# Patient Record
Sex: FEMALE | Race: WHITE | HISPANIC OR LATINO | URBAN - METROPOLITAN AREA
[De-identification: names, ages, dates, MRNs, and addresses within clinical notes are randomized per-mention and may not be internally consistent; named-entity substitution may affect disease eponyms.]

---

## 2018-06-22 ENCOUNTER — EMERGENCY (EMERGENCY)
Facility: HOSPITAL | Age: 43
LOS: 1 days | Discharge: HOME | End: 2018-06-22
Attending: EMERGENCY MEDICINE
Payer: SELF-PAY

## 2018-06-22 VITALS
RESPIRATION RATE: 22 BRPM | SYSTOLIC BLOOD PRESSURE: 125 MMHG | HEART RATE: 108 BPM | HEIGHT: 66 IN | WEIGHT: 139.99 LBS | OXYGEN SATURATION: 98 % | DIASTOLIC BLOOD PRESSURE: 78 MMHG | TEMPERATURE: 102 F

## 2018-06-22 VITALS
HEART RATE: 85 BPM | TEMPERATURE: 99 F | OXYGEN SATURATION: 100 % | DIASTOLIC BLOOD PRESSURE: 67 MMHG | RESPIRATION RATE: 20 BRPM | SYSTOLIC BLOOD PRESSURE: 114 MMHG

## 2018-06-22 DIAGNOSIS — Z98.890 OTHER SPECIFIED POSTPROCEDURAL STATES: Chronic | ICD-10-CM

## 2018-06-22 DIAGNOSIS — Z90.49 ACQUIRED ABSENCE OF OTHER SPECIFIED PARTS OF DIGESTIVE TRACT: Chronic | ICD-10-CM

## 2018-06-22 LAB
ACETONE SERPL-MCNC: NEGATIVE — SIGNIFICANT CHANGE UP
ALBUMIN SERPL ELPH-MCNC: 3.6 G/DL — SIGNIFICANT CHANGE UP (ref 3.5–5)
ALP SERPL-CCNC: 65 U/L — SIGNIFICANT CHANGE UP (ref 40–120)
ALT FLD-CCNC: 32 U/L DA — SIGNIFICANT CHANGE UP (ref 10–60)
ANION GAP SERPL CALC-SCNC: 7 MMOL/L — SIGNIFICANT CHANGE UP (ref 5–17)
APPEARANCE UR: CLEAR — SIGNIFICANT CHANGE UP
APTT BLD: 26.8 SEC — LOW (ref 27.5–37.4)
AST SERPL-CCNC: 29 U/L — SIGNIFICANT CHANGE UP (ref 10–40)
BACTERIA # UR AUTO: ABNORMAL /HPF
BASOPHILS # BLD AUTO: 0.1 K/UL — SIGNIFICANT CHANGE UP (ref 0–0.2)
BASOPHILS # BLD AUTO: 0.2 K/UL — SIGNIFICANT CHANGE UP (ref 0–0.2)
BASOPHILS NFR BLD AUTO: 0.5 % — SIGNIFICANT CHANGE UP (ref 0–2)
BASOPHILS NFR BLD AUTO: 1.2 % — SIGNIFICANT CHANGE UP (ref 0–2)
BILIRUB SERPL-MCNC: 0.9 MG/DL — SIGNIFICANT CHANGE UP (ref 0.2–1.2)
BILIRUB UR-MCNC: NEGATIVE — SIGNIFICANT CHANGE UP
BUN SERPL-MCNC: 6 MG/DL — LOW (ref 7–18)
CALCIUM SERPL-MCNC: 9 MG/DL — SIGNIFICANT CHANGE UP (ref 8.4–10.5)
CHLORIDE SERPL-SCNC: 104 MMOL/L — SIGNIFICANT CHANGE UP (ref 96–108)
CO2 SERPL-SCNC: 27 MMOL/L — SIGNIFICANT CHANGE UP (ref 22–31)
COLOR SPEC: SIGNIFICANT CHANGE UP
CREAT SERPL-MCNC: 0.73 MG/DL — SIGNIFICANT CHANGE UP (ref 0.5–1.3)
DIFF PNL FLD: NEGATIVE — SIGNIFICANT CHANGE UP
EOSINOPHIL # BLD AUTO: 0 K/UL — SIGNIFICANT CHANGE UP (ref 0–0.5)
EOSINOPHIL # BLD AUTO: 0 K/UL — SIGNIFICANT CHANGE UP (ref 0–0.5)
EOSINOPHIL NFR BLD AUTO: 0 % — SIGNIFICANT CHANGE UP (ref 0–6)
EOSINOPHIL NFR BLD AUTO: 0.2 % — SIGNIFICANT CHANGE UP (ref 0–6)
EPI CELLS # UR: ABNORMAL /HPF
GLUCOSE SERPL-MCNC: 109 MG/DL — HIGH (ref 70–99)
GLUCOSE UR QL: NEGATIVE — SIGNIFICANT CHANGE UP
HCG SERPL-ACNC: <1 MIU/ML — SIGNIFICANT CHANGE UP
HCG UR QL: NEGATIVE — SIGNIFICANT CHANGE UP
HCT VFR BLD CALC: 41.4 % — SIGNIFICANT CHANGE UP (ref 34.5–45)
HCT VFR BLD CALC: 43.2 % — SIGNIFICANT CHANGE UP (ref 34.5–45)
HGB BLD-MCNC: 13.5 G/DL — SIGNIFICANT CHANGE UP (ref 11.5–15.5)
HGB BLD-MCNC: 14.2 G/DL — SIGNIFICANT CHANGE UP (ref 11.5–15.5)
INR BLD: 1.01 RATIO — SIGNIFICANT CHANGE UP (ref 0.88–1.16)
KETONES UR-MCNC: NEGATIVE — SIGNIFICANT CHANGE UP
LACTATE SERPL-SCNC: 1.7 MMOL/L — SIGNIFICANT CHANGE UP (ref 0.7–2)
LEUKOCYTE ESTERASE UR-ACNC: ABNORMAL
LIDOCAIN IGE QN: 83 U/L — SIGNIFICANT CHANGE UP (ref 73–393)
LYMPHOCYTES # BLD AUTO: 1.1 K/UL — SIGNIFICANT CHANGE UP (ref 1–3.3)
LYMPHOCYTES # BLD AUTO: 1.4 K/UL — SIGNIFICANT CHANGE UP (ref 1–3.3)
LYMPHOCYTES # BLD AUTO: 11.1 % — LOW (ref 13–44)
LYMPHOCYTES # BLD AUTO: 6 % — LOW (ref 13–44)
MAGNESIUM SERPL-MCNC: 2.2 MG/DL — SIGNIFICANT CHANGE UP (ref 1.6–2.6)
MCHC RBC-ENTMCNC: 31.7 PG — SIGNIFICANT CHANGE UP (ref 27–34)
MCHC RBC-ENTMCNC: 31.7 PG — SIGNIFICANT CHANGE UP (ref 27–34)
MCHC RBC-ENTMCNC: 32.6 GM/DL — SIGNIFICANT CHANGE UP (ref 32–36)
MCHC RBC-ENTMCNC: 32.9 GM/DL — SIGNIFICANT CHANGE UP (ref 32–36)
MCV RBC AUTO: 96.5 FL — SIGNIFICANT CHANGE UP (ref 80–100)
MCV RBC AUTO: 97.2 FL — SIGNIFICANT CHANGE UP (ref 80–100)
MONOCYTES # BLD AUTO: 0.4 K/UL — SIGNIFICANT CHANGE UP (ref 0–0.9)
MONOCYTES # BLD AUTO: 1.3 K/UL — HIGH (ref 0–0.9)
MONOCYTES NFR BLD AUTO: 3 % — SIGNIFICANT CHANGE UP (ref 2–14)
MONOCYTES NFR BLD AUTO: 7.2 % — SIGNIFICANT CHANGE UP (ref 2–14)
NEUTROPHILS # BLD AUTO: 10.6 K/UL — HIGH (ref 1.8–7.4)
NEUTROPHILS # BLD AUTO: 15.2 K/UL — HIGH (ref 1.8–7.4)
NEUTROPHILS NFR BLD AUTO: 85.2 % — HIGH (ref 43–77)
NEUTROPHILS NFR BLD AUTO: 85.7 % — HIGH (ref 43–77)
NITRITE UR-MCNC: NEGATIVE — SIGNIFICANT CHANGE UP
PH UR: 8 — SIGNIFICANT CHANGE UP (ref 5–8)
PLATELET # BLD AUTO: 216 K/UL — SIGNIFICANT CHANGE UP (ref 150–400)
PLATELET # BLD AUTO: 228 K/UL — SIGNIFICANT CHANGE UP (ref 150–400)
POTASSIUM SERPL-MCNC: 4.4 MMOL/L — SIGNIFICANT CHANGE UP (ref 3.5–5.3)
POTASSIUM SERPL-SCNC: 4.4 MMOL/L — SIGNIFICANT CHANGE UP (ref 3.5–5.3)
PROT SERPL-MCNC: 7.1 G/DL — SIGNIFICANT CHANGE UP (ref 6–8.3)
PROT UR-MCNC: NEGATIVE — SIGNIFICANT CHANGE UP
PROTHROM AB SERPL-ACNC: 11 SEC — SIGNIFICANT CHANGE UP (ref 9.8–12.7)
RBC # BLD: 4.26 M/UL — SIGNIFICANT CHANGE UP (ref 3.8–5.2)
RBC # BLD: 4.48 M/UL — SIGNIFICANT CHANGE UP (ref 3.8–5.2)
RBC # FLD: 11.9 % — SIGNIFICANT CHANGE UP (ref 10.3–14.5)
RBC # FLD: 12.1 % — SIGNIFICANT CHANGE UP (ref 10.3–14.5)
RBC CASTS # UR COMP ASSIST: SIGNIFICANT CHANGE UP /HPF (ref 0–2)
SODIUM SERPL-SCNC: 138 MMOL/L — SIGNIFICANT CHANGE UP (ref 135–145)
SP GR SPEC: 1.01 — SIGNIFICANT CHANGE UP (ref 1.01–1.02)
UROBILINOGEN FLD QL: NEGATIVE — SIGNIFICANT CHANGE UP
WBC # BLD: 12.4 K/UL — HIGH (ref 3.8–10.5)
WBC # BLD: 17.8 K/UL — HIGH (ref 3.8–10.5)
WBC # FLD AUTO: 12.4 K/UL — HIGH (ref 3.8–10.5)
WBC # FLD AUTO: 17.8 K/UL — HIGH (ref 3.8–10.5)
WBC UR QL: SIGNIFICANT CHANGE UP /HPF (ref 0–5)

## 2018-06-22 PROCEDURE — 99285 EMERGENCY DEPT VISIT HI MDM: CPT

## 2018-06-22 PROCEDURE — 74177 CT ABD & PELVIS W/CONTRAST: CPT

## 2018-06-22 PROCEDURE — 83605 ASSAY OF LACTIC ACID: CPT

## 2018-06-22 PROCEDURE — 84702 CHORIONIC GONADOTROPIN TEST: CPT

## 2018-06-22 PROCEDURE — 80053 COMPREHEN METABOLIC PANEL: CPT

## 2018-06-22 PROCEDURE — 96374 THER/PROPH/DIAG INJ IV PUSH: CPT | Mod: XU

## 2018-06-22 PROCEDURE — 96375 TX/PRO/DX INJ NEW DRUG ADDON: CPT

## 2018-06-22 PROCEDURE — 81001 URINALYSIS AUTO W/SCOPE: CPT

## 2018-06-22 PROCEDURE — 82009 KETONE BODYS QUAL: CPT

## 2018-06-22 PROCEDURE — 87086 URINE CULTURE/COLONY COUNT: CPT

## 2018-06-22 PROCEDURE — 76830 TRANSVAGINAL US NON-OB: CPT

## 2018-06-22 PROCEDURE — 85027 COMPLETE CBC AUTOMATED: CPT

## 2018-06-22 PROCEDURE — 85730 THROMBOPLASTIN TIME PARTIAL: CPT

## 2018-06-22 PROCEDURE — 76856 US EXAM PELVIC COMPLETE: CPT | Mod: 26

## 2018-06-22 PROCEDURE — 76830 TRANSVAGINAL US NON-OB: CPT | Mod: 26

## 2018-06-22 PROCEDURE — 74177 CT ABD & PELVIS W/CONTRAST: CPT | Mod: 26

## 2018-06-22 PROCEDURE — 83735 ASSAY OF MAGNESIUM: CPT

## 2018-06-22 PROCEDURE — 85610 PROTHROMBIN TIME: CPT

## 2018-06-22 PROCEDURE — 83690 ASSAY OF LIPASE: CPT

## 2018-06-22 PROCEDURE — 81025 URINE PREGNANCY TEST: CPT

## 2018-06-22 PROCEDURE — 76856 US EXAM PELVIC COMPLETE: CPT

## 2018-06-22 PROCEDURE — 99284 EMERGENCY DEPT VISIT MOD MDM: CPT | Mod: 25

## 2018-06-22 RX ORDER — KETOROLAC TROMETHAMINE 30 MG/ML
30 SYRINGE (ML) INJECTION ONCE
Qty: 0 | Refills: 0 | Status: DISCONTINUED | OUTPATIENT
Start: 2018-06-22 | End: 2018-06-22

## 2018-06-22 RX ORDER — CIPROFLOXACIN LACTATE 400MG/40ML
400 VIAL (ML) INTRAVENOUS ONCE
Qty: 0 | Refills: 0 | Status: COMPLETED | OUTPATIENT
Start: 2018-06-22 | End: 2018-06-22

## 2018-06-22 RX ORDER — METRONIDAZOLE 500 MG
500 TABLET ORAL ONCE
Qty: 0 | Refills: 0 | Status: COMPLETED | OUTPATIENT
Start: 2018-06-22 | End: 2018-06-22

## 2018-06-22 RX ORDER — SODIUM CHLORIDE 9 MG/ML
3 INJECTION INTRAMUSCULAR; INTRAVENOUS; SUBCUTANEOUS ONCE
Qty: 0 | Refills: 0 | Status: COMPLETED | OUTPATIENT
Start: 2018-06-22 | End: 2018-06-22

## 2018-06-22 RX ORDER — SODIUM CHLORIDE 9 MG/ML
2000 INJECTION INTRAMUSCULAR; INTRAVENOUS; SUBCUTANEOUS ONCE
Qty: 0 | Refills: 0 | Status: COMPLETED | OUTPATIENT
Start: 2018-06-22 | End: 2018-06-22

## 2018-06-22 RX ADMIN — Medication 200 MILLIGRAM(S): at 15:27

## 2018-06-22 RX ADMIN — Medication 100 MILLIGRAM(S): at 16:27

## 2018-06-22 RX ADMIN — Medication 30 MILLIGRAM(S): at 13:44

## 2018-06-22 RX ADMIN — SODIUM CHLORIDE 3 MILLILITER(S): 9 INJECTION INTRAMUSCULAR; INTRAVENOUS; SUBCUTANEOUS at 12:32

## 2018-06-22 RX ADMIN — SODIUM CHLORIDE 2000 MILLILITER(S): 9 INJECTION INTRAMUSCULAR; INTRAVENOUS; SUBCUTANEOUS at 13:32

## 2018-06-22 RX ADMIN — Medication 30 MILLIGRAM(S): at 16:13

## 2018-06-22 NOTE — ED PROVIDER NOTE - PROGRESS NOTE DETAILS
pt feeling much better, abd pain resolved, ate, tolerated well, repeat WBC 17k-12k, will d/c home.  D/w pt & family that if blood culture +, will call pt back pt feeling much better, abd pain resolved, ate, tolerated well, repeat WBC 17k-12k, will d/c home.  D/w pt & family that if blood culture +, will call pt back.  Also advised to f/u with gyn regarding uterine fibroid, pt has not seen gyn for yrs

## 2018-06-22 NOTE — ED PROVIDER NOTE - OBJECTIVE STATEMENT
41 y/o F pt w/ no significant PMHx, LMP last week presents to ED c/o nausea since last night. Pt reports that she also has fever, chills, myalgia, and constant, non-radiating LLQ pain that is crampy and 9/10 in severity. Pt denies cough, dysuria, vaginal discharge, or any other complaints. LBM: yesterday. 41 y/o F pt w/ no significant PMHx, LMP last week presents to ED c/o nausea since last night. Pt reports that she also has fever, chills, myalgia, and constant, non-radiating LLQ pain that is crampy and 9/10 in severity. Pt denies cough, dysuria, vaginal discharge, or any other complaints. LBM: yesterday. Pt reports that she is feeling a little hungry. Pt claimed that she had diarrhea and vomiting last Sunday, but that it got better. Pt took Tylenol w/ some relief. Pt went to urgent care this morning and was sent to ED for further workup. NKDA.

## 2018-06-22 NOTE — ED PROVIDER NOTE - CARE PLAN
Principal Discharge DX:	Fever  Secondary Diagnosis:	Abdominal pain  Secondary Diagnosis:	Uterine fibroid

## 2018-06-23 LAB
CULTURE RESULTS: SIGNIFICANT CHANGE UP
SPECIMEN SOURCE: SIGNIFICANT CHANGE UP

## 2020-08-22 NOTE — ED PROVIDER NOTE - HISTORY ATTESTATION, MLM
Patient called and stated that she has had a couple of bad nights sleep and she wanted a recommendation for something she could take otc  Advised that Dr Anoop Lopez was out of the office and there was many otc options and that I would have to send the message to another provider for review  She refused  Offered appointment with Dr Anoop Lopez, states she has one coming up in a few weeks and will discuss at that time  NO further action needed at this time    Yvrose Shipley MA
Reviewed    No further action required    Maribeth Lange DO
I have reviewed and confirmed nurses' notes...

## 2023-06-18 NOTE — ED PROVIDER NOTE - CHPI ED SYMPTOMS POS
There are no Wet Read(s) to document. There are 2 Wet Read(s) to document. There are 4 Wet Read(s) to document. NAUSEA There are 3 Wet Read(s) to document. NAUSEA/fever, chills, myalgia, abd pain

## 2025-06-27 NOTE — ED ADULT TRIAGE NOTE - ESI TRIAGE ACUITY LEVEL, MLM
EXAM NOTE    Chief Complaint   Patient presents with   • Hand Injury     Right top of hand goes down into finger- on can opener working at [employer] - gave tdap today         HISTORY    Essie Mendoza is a 42 year old female who presents for evaluation of a laceration on the dorsal aspect of the right hand between the 2nd and 3rd MCP joints.  Injury occurred at work. Patient was opening a can when the lid cut her hand.  Patient is a  at [employer].  Bleeding is controlled.  Patient is able to make a fist without difficulty.  Denies pain in her finger joints.  Denies numbness and tingling.  Patient is right-hand dominant.  Treatment tried prior to visit: cleaned and applied pressure. Coworker present during the visit.       Current Outpatient Medications   Medication Sig   • triamcinolone (ARISTOCORT) 0.1 % cream Apply topically 3 times daily. Pulse dose, so can use for two weeks on and then take two weeks off. (Patient not taking: Reported on 6/27/2025)   • hydrOXYzine (VISTARIL) 25 MG capsule Take 1 capsule by mouth every 6 hours as needed for Itching. Caution: sedation (Patient not taking: Reported on 6/27/2025)   • ketoconazole (NIZORAL) 2 % cream Apply a thin layer to the affected area and surrounding skin daily x 2 weeks. Use an additional 3 days after rash is gone. (Patient not taking: Reported on 6/27/2025)   • cetirizine (ZyrTEC) 10 MG tablet Take 1 tablet by mouth daily.   • vitamin D3 (CHOLECALCIFEROL) 1.25 mg (50,000 units) capsule    • nitrofurantoin, macrocrystal-monohydrate, (Macrobid) 100 MG capsule Take 1 capsule by mouth in the morning and 1 capsule in the evening. (Patient not taking: Reported on 6/27/2025)     No current facility-administered medications for this visit.       ALLERGIES:  No Known Allergies    No past medical history on file.    REVIEW OF SYSTEMS    See history of present illness, otherwise 10 point review of systems is negative.     PHYSICAL EXAMINATION     Visit Vitals  /70 (BP Location: LUE - Left upper extremity, Patient Position: Sitting, Cuff Size: Regular)   Pulse 80   Temp 97.8 °F (36.6 °C) (Oral)   Resp 14   Wt 49.9 kg (110 lb)   SpO2 99%   BMI 23.80 kg/m²     GENERAL: Alert and oriented, in no acute distress, appears stated age  EYES: PERRL, EOMI (Pupils equal, round, reactive to light, extraocular movements intact). Conjunctivae normal. No discharge.   HEAD: Normocephalic, atraumatic  LUNGS: Breathing comfortably.   EXTREMITIES: Warm and well perfused.   NEURO: Normal gait. No focal deficits noted.   SKIN: Warm, dry, and without pallor.   PSYCH: Mood and affect appropriate.   RIGHT HAND:  2 cm laceration on the dorsal aspect of the right hand between the 2nd and 3rd MCP joints.  Patient reports mild pain.  2nd and 3rd MCP joints are nontender with full range of motion.  Bleeding controlled.  Sensation intact.  2+ radial pulse.  Cap refill <2 seconds.       Procedure Note - Simple Laceration     Location: 2 cm laceration on the dorsal aspect of the right hand between the 2nd and 3rd MCP joints.     Informed consent was obtained before procedure started.   Procedure: Local anesthesia was obtained with 2 cc of lidocaine 1% without epinephrine. The wound was copiously irrigated with sterile saline. Area was cleansed, prepped, and draped in the usual sterile fashion.The laceration was then closed using six 4-0 Prolene simple interrupted sutures were placed.     Estimated blood loss was less than 0.5 mL. The patient tolerated procedure well without any complications. A dressing was applied to area and anticipatory guidance as well as standard post procedure care was explained. Written instructions also provided.      ASSESSMENT AND PLAN    1. Laceration of right hand without foreign body, initial encounter        Essie Mendoza is a 42 year old female with no significant medical history who presents for evaluation of a laceration on the dorsal aspect of  3 the right hand between the 2nd and 3rd MCP joints.  Injury occurred at work. Patient was opening a can when the lid cut her hand.  Patient is a  at [employer]. Vital signs stable, patient alert and in no acute distress, non-toxic in appearance. Using sterile procedure, laceration was closed using six 4-0 Prolene simple interrupted sutures.  Patient tolerated well.  Clean dressing applied.  Wound care instructions provided. Recommend keeping 2nd and 3rd fingers roman taped together to prevent wound from opening.  Tdap administered, patient tolerated well.  Return in 7-10 days for suture removal.  Worker's Compensation forms completed and given to the patient.     Patient was instructed to return if symptoms do not resolve. We also discussed red flag symptoms for when to seek higher level care from an emergency department. Patient verbalized understanding and agrees to plan. Verbal and written instructions were provided.    Orders Placed This Encounter   • REPR SUPERF WND BODY 2.5CM OR LESS   • TDAP ([employer]RI[employer])       I spent a total of 30 minutes on the day of the visit.  This includes chart review and documenting.